# Patient Record
Sex: MALE | Race: WHITE
[De-identification: names, ages, dates, MRNs, and addresses within clinical notes are randomized per-mention and may not be internally consistent; named-entity substitution may affect disease eponyms.]

---

## 2018-04-09 ENCOUNTER — HOSPITAL ENCOUNTER (OUTPATIENT)
Dept: HOSPITAL 45 - C.LABPBG | Age: 43
Discharge: HOME | End: 2018-04-09
Attending: SURGERY
Payer: COMMERCIAL

## 2018-04-09 DIAGNOSIS — Z01.812: Primary | ICD-10-CM

## 2018-04-09 DIAGNOSIS — K43.2: ICD-10-CM

## 2018-04-09 DIAGNOSIS — E03.9: ICD-10-CM

## 2018-04-09 LAB
BASOPHILS # BLD: 0.04 K/UL (ref 0–0.2)
BASOPHILS NFR BLD: 0.6 %
BUN SERPL-MCNC: 15 MG/DL (ref 7–18)
CALCIUM SERPL-MCNC: 8.7 MG/DL (ref 8.5–10.1)
CO2 SERPL-SCNC: 29 MMOL/L (ref 21–32)
CREAT SERPL-MCNC: 1.09 MG/DL (ref 0.6–1.4)
EOS ABS #: 0.16 K/UL (ref 0–0.5)
EOSINOPHIL NFR BLD AUTO: 192 K/UL (ref 130–400)
GLUCOSE SERPL-MCNC: 107 MG/DL (ref 70–99)
HCT VFR BLD CALC: 41.8 % (ref 42–52)
HGB BLD-MCNC: 14.7 G/DL (ref 14–18)
IG#: 0.02 K/UL (ref 0–0.02)
IMM GRANULOCYTES NFR BLD AUTO: 20.8 %
LYMPHOCYTES # BLD: 1.43 K/UL (ref 1.2–3.4)
MCH RBC QN AUTO: 31.3 PG (ref 25–34)
MCHC RBC AUTO-ENTMCNC: 35.2 G/DL (ref 32–36)
MCV RBC AUTO: 89.1 FL (ref 80–100)
MONO ABS #: 0.41 K/UL (ref 0.11–0.59)
MONOCYTES NFR BLD: 6 %
NEUT ABS #: 4.82 K/UL (ref 1.4–6.5)
NEUTROPHILS # BLD AUTO: 2.3 %
NEUTROPHILS NFR BLD AUTO: 70 %
PMV BLD AUTO: 12.1 FL (ref 7.4–10.4)
POTASSIUM SERPL-SCNC: 4 MMOL/L (ref 3.5–5.1)
RED CELL DISTRIBUTION WIDTH CV: 13.8 % (ref 11.5–14.5)
RED CELL DISTRIBUTION WIDTH SD: 44.4 FL (ref 36.4–46.3)
SODIUM SERPL-SCNC: 138 MMOL/L (ref 136–145)
WBC # BLD AUTO: 6.88 K/UL (ref 4.8–10.8)

## 2018-04-18 ENCOUNTER — HOSPITAL ENCOUNTER (OUTPATIENT)
Dept: HOSPITAL 45 - C.ACU | Age: 43
Discharge: HOME | End: 2018-04-18
Attending: SURGERY
Payer: COMMERCIAL

## 2018-04-18 VITALS
BODY MASS INDEX: 34.93 KG/M2 | BODY MASS INDEX: 34.93 KG/M2 | HEIGHT: 67.99 IN | WEIGHT: 230.49 LBS | WEIGHT: 230.49 LBS | HEIGHT: 67.99 IN

## 2018-04-18 VITALS
OXYGEN SATURATION: 97 % | SYSTOLIC BLOOD PRESSURE: 133 MMHG | TEMPERATURE: 98.42 F | HEART RATE: 86 BPM | DIASTOLIC BLOOD PRESSURE: 87 MMHG

## 2018-04-18 VITALS
TEMPERATURE: 98.06 F | SYSTOLIC BLOOD PRESSURE: 110 MMHG | HEART RATE: 86 BPM | OXYGEN SATURATION: 93 % | DIASTOLIC BLOOD PRESSURE: 55 MMHG

## 2018-04-18 VITALS
OXYGEN SATURATION: 93 % | HEART RATE: 75 BPM | DIASTOLIC BLOOD PRESSURE: 57 MMHG | SYSTOLIC BLOOD PRESSURE: 115 MMHG | TEMPERATURE: 98.06 F

## 2018-04-18 VITALS
DIASTOLIC BLOOD PRESSURE: 55 MMHG | OXYGEN SATURATION: 92 % | TEMPERATURE: 98.06 F | SYSTOLIC BLOOD PRESSURE: 114 MMHG | HEART RATE: 89 BPM

## 2018-04-18 DIAGNOSIS — J45.909: ICD-10-CM

## 2018-04-18 DIAGNOSIS — E03.9: ICD-10-CM

## 2018-04-18 DIAGNOSIS — Z79.899: ICD-10-CM

## 2018-04-18 DIAGNOSIS — K43.2: Primary | ICD-10-CM

## 2018-04-18 DIAGNOSIS — E66.9: ICD-10-CM

## 2018-04-18 DIAGNOSIS — Z80.42: ICD-10-CM

## 2018-04-18 RX ADMIN — FENTANYL CITRATE PRN MCG: 50 INJECTION, SOLUTION INTRAMUSCULAR; INTRAVENOUS at 10:48

## 2018-04-18 RX ADMIN — HYDROMORPHONE HYDROCHLORIDE PRN MG: 1 INJECTION, SOLUTION INTRAMUSCULAR; INTRAVENOUS; SUBCUTANEOUS at 11:30

## 2018-04-18 RX ADMIN — FENTANYL CITRATE PRN MCG: 50 INJECTION, SOLUTION INTRAMUSCULAR; INTRAVENOUS at 10:42

## 2018-04-18 RX ADMIN — FENTANYL CITRATE PRN MCG: 50 INJECTION, SOLUTION INTRAMUSCULAR; INTRAVENOUS at 10:38

## 2018-04-18 RX ADMIN — HYDROMORPHONE HYDROCHLORIDE PRN MG: 1 INJECTION, SOLUTION INTRAMUSCULAR; INTRAVENOUS; SUBCUTANEOUS at 10:35

## 2018-04-18 RX ADMIN — FENTANYL CITRATE PRN MCG: 50 INJECTION, SOLUTION INTRAMUSCULAR; INTRAVENOUS at 10:54

## 2018-04-18 NOTE — DISCHARGE INSTRUCTIONS
Discharge Instructions


Date of Service


Apr 18, 2018.





Visit


Reason for Visit:  Incisional Hernia





Discharge


Discharge Diagnosis / Problem:  hernia repair





Discharge Goals


Goal(s):  Decrease discomfort





Activity Recommendations


Activity Limitations:  as noted below


Lifting Limitations:  no more than 10 pounds


Shower/Bathe:  no limitations


Driving or Machine Use:  resume 3 days after discharge





Anesthesia


.





Post Anesthesia Instructions:





If you have had General Anesthesia or IV Sedation:





*  Do not drive today.


*  Resume driving when surgeon permits.


*  Do not make important decisions or sign legal documents today.


*  Call surgeon for:





   1.  Temperature elevations greater than 101 degrees F.


   2.  Uncontrollable pain.


   3.  Excessive bleeding.


   4.  Persistent nausea and vomiting.


   5.  Medication intolerance (nausea, vomiting or rash).





*  For nausea and vomiting use only clear liquids such as: tea, soda, bouillon 

until nausea subsides, then gradually increase diet as tolerated.





*  If you have any concerns or questions, call your surgeon's office.  If 

physician is unavailable and it is an emergency, call 911 or go to the nearest 

emergency room.





.





Instructions / Follow-Up


Instructions / Follow-Up


Dr. Bowens in 1-2 weeks as planned, call [phone] if you do not already have 

an appt or have any questions





Diet Recommendations


Recommended Home Diet:  no limitations





Pending Studies


Studies pending at discharge:  no





Medical Emergencies








.


Who to Call and When:





Medical Emergencies:  If at any time you feel your situation is an emergency, 

please call 911 immediately.





.





Non-Emergent Contact


Non-Emergency issues call your:  Surgeon


Call Non-Emergent contact if:  you have a fever, temperature is above 101.5, 

your pain is not controlled, wound has increased drainage, wound has increased 

redness, you have any medication questions





.


.








"Provider Documentation" section prepared by Boyd Bassett.








.

## 2018-04-18 NOTE — [facility] OPERATIVE REPORT
Operative Report


Operative Date


Apr 18, 2018.





Pre-Operative Diagnosis





Incisional Hernia





Post-Operative Diagnosis





multiple Incisional Hernias ( 4); adhesions





Procedure(s) Performed





Open Repair of Multiple Incisional Hernias with Mesh, Enterolysis





Surgeon


Dr. Bowens





Assistant Surgeon(s)


ARTEMIO Kumar





Estimated Blood Loss


20 ml





Specimens





none per surgeon





Anesthesia Type


General





Complication(s)


none





Description of Procedure


After informed consent was obtained the patient was taken the operating room 

placed in supine position.  After successful intubation the abdomen was shaved 

and sterilely prepped and draped in usual fashion.  I began by making an 

elliptical incision around his widened upper midline scar.  We remove the scar 

in its entirety using electrocautery.  Initially this revealed 1 approximately 3

-4 cm defect.  We took down the hernia sac using electrocautery.  There were 

adhesions to the undersurface of the fascia involving some small bowel and 

omentum.  We took these down using scissors a small amount of cautery and blunt 

dissection.  Once I had this large defect skeletonized I was able to palpate 

underneath the fascia and located multiple smaller incisional hernias.  The 2 

larger ones we were able to bridge to create a second large defect.  Then there 

was a small lateral defect that I was able to closed using #1 Ethibond from the 

undersurface of the fascia. It was only approx 1 cm in size and was also 

incorporated with the mesh onlay.  I was able to primarily close this defect 

without difficulty.  Eventually I had 1 large midline defect after cutting the 

fascial bridges.  I was able to free this up and there was enough laxity that I 

could primarily close it using #1 Ethibond in interrupted figure-of-eight 

fashion.  This went from the xiphoid process down to just above the umbilicus.  

I thoroughly irrigated the wound.  I used a piece of Ovitex mesh as an onlay.  

I secured it using 0 Ethibond in interrupted fashion.  This mesh covered the 

entire defect for multiple centimeters in all directions and laid tension-free.

  Once I had the secured I irrigated the wound a final time.  I used Taiwo 

powder on all the raw surfaces to help prevent seroma formation.  Also placed a 

10 flat Max-Downey drain brought through a separate stab wound in the right 

mid abdomen and secured it using 2-0 nylon to the skin.  We then closed the 

wound in multiple layers using 2-0 Vicryl for deep layers 3-0 Vicryl for mid 

layers and 3-0 Monocryl for the skin.  Sterile dressing was applied as well as 

an abdominal binder. The pt was awakened, extubated and transferred to recovery 

in stable condition





My physician assistant was present through the entire case.  He helped prep the 

patient.  He helped with wound retraction throughout the entire procedure as 

well as wound closure and dressing placement.


I attest to the content of the Intraoperative Record and any orders documented 

therein.  Any exceptions are noted below.

## 2018-04-18 NOTE — ANESTHESIOLOGY PROGRESS NOTE
Anesthesia Post Op Note


Date & Time


Apr 18, 2018 at 11:04





Vital Signs


Pain Intensity:  7





Vital Signs Past 12 Hours








  Date Time  Temp Pulse Resp B/P (MAP) Pulse Ox O2 Delivery O2 Flow Rate FiO2


 


4/18/18 10:43  80 10     


 


4/18/18 10:43  79 10  94   


 


4/18/18 10:41    127/67    


 


4/18/18 10:38  79 15     


 


4/18/18 10:38  79 15  97   


 


4/18/18 10:36    130/72    


 


4/18/18 10:33  80 13  93   


 


4/18/18 10:33  80 13     


 


4/18/18 10:32    123/72    


 


4/18/18 10:28  85 17  90   


 


4/18/18 10:28  84 17     


 


4/18/18 10:26    120/76    


 


4/18/18 10:24    134/80    


 


4/18/18 10:23 36.1 85 12 120/76 (91) 92 Oxymask 10 


 


4/18/18 10:23  86   89   


 


4/18/18 10:23  86      


 


4/18/18 06:26 36.9 86 18 133/87 (102) 97 Room Air  











Notes


Mental Status:  alert / awake / arousable, participated in evaluation


Pt Amnestic to Procedure:  Yes


Nausea / Vomiting:  adequately controlled


Pain:  adequately controlled


Airway Patency, RR, SpO2:  stable & adequate


BP & HR:  stable & adequate


Hydration State:  stable & adequate


Anesthetic Complications:  no major complications apparent

## 2018-04-18 NOTE — [facility] POST OPERATIVE BRIEF NOTE
Immediate Operative Summary


Operative Date


Apr 18, 2018.





Pre-Operative Diagnosis





Incisional Hernia





Post-Operative Diagnosis





multiple Incisional Hernias ( 4); adhesions





Procedure(s) Performed





Open Repair of Multiple Incisional Hernias with Mesh, Enterolysis





Surgeon


Dr. Bowens





Assistant Surgeon(s)


ARTEMIO Kumar





Estimated Blood Loss


20 ml





Findings


Consistent with Post-Op Diagnosis





Specimens





none per surgeon





Anesthesia Type


General





Complication(s)


none